# Patient Record
Sex: FEMALE | Race: WHITE | NOT HISPANIC OR LATINO | URBAN - METROPOLITAN AREA
[De-identification: names, ages, dates, MRNs, and addresses within clinical notes are randomized per-mention and may not be internally consistent; named-entity substitution may affect disease eponyms.]

---

## 2020-02-11 NOTE — PATIENT DISCUSSION
PATIENT CURRENTLY BREASTFEEDING -  DISC SMALL RISK OF TOPICAL DROPS ENTERING BLOODSTREAM. PT ADVISED ITS TO HER DISCRETION IF SHE WANTS TO CONSULT OBGYN/PEDIATRICIAN FOR CLEARANCE PRIOR TO TREATMENT. REPORTS STABLE RX &gt; MULTIPLE YEARS.

## 2020-06-02 NOTE — PATIENT DISCUSSION
PATIENT CURRENTLY BREASTFEEDING - DISC SMALL RISK OF TOPICAL DROPS ENTERING BLOODSTREAM. PT ADVISED ITS TO HER DISCRETION IF SHE WANTS TO CONSULT OBGYN/PEDIATRICIAN FOR CLEARANCE PRIOR TO TREATMENT. REPORTS STABLE RX &gt; MULTIPLE YEARS. PT ADVISED LORAZEPAM IS CATEGORY C DRUG; RECOMMEND CONSULT OBGYN VS. PUMP AND DUMP.

## 2020-06-02 NOTE — PATIENT DISCUSSION
New Prescription: Zymaxid (gatifloxacin): drops: 0.5% 1 drop four times a day as directed into both eyes 06-

## 2020-06-02 NOTE — PATIENT DISCUSSION
CYLINDER/AXIS CONFIRMED - PT.  PREFERS NO CYL/AXIS IN MANIFEST , CHALLENGED MULTIPLE TIMES AND PT KICKED OUT CYL EVERY TIME

## 2020-06-02 NOTE — PATIENT DISCUSSION
New Prescription: Pred Forte (prednisolone acetate): drops,suspension: 1% 1 drop as directed into both eyes 06-

## 2020-06-04 NOTE — PATIENT DISCUSSION
MYOPIA, OU- DISC OPT OF REFRACTIVE [id number]. PT UNDERSTANDS OPTIONS AND DESIRES TO PROCEED WITH LASIK TO REDUCE DEPENDENCY ON GLS/CTLS.

## 2020-06-04 NOTE — PATIENT DISCUSSION
Continue: Zymaxid (gatifloxacin): drops: 0.5% 1 drop four times a day as directed into both eyes 06-

## 2020-06-04 NOTE — PATIENT DISCUSSION
Continue: Pred Forte (prednisolone acetate): drops,suspension: 1% 1 drop as directed into both eyes 06-

## 2020-06-11 NOTE — PATIENT DISCUSSION
Stopped Today: Pred Forte (prednisolone acetate): drops,suspension: 1% 1 drop as directed into both eyes 06-

## 2020-06-11 NOTE — PATIENT DISCUSSION
Stopped Today: Zymaxid (gatifloxacin): drops: 0.5% 1 drop four times a day as directed into both eyes 06-

## 2021-02-27 NOTE — PATIENT DISCUSSION
Bedside and Verbal shift change report given to Sofia Vu (oncoming nurse) by Kingsley Hurst (offgoing nurse). Report included the following information SBAR, Kardex, ED Summary, Procedure Summary, MAR and Recent Results. It was discussed and explained that in the event topographical mapping indicates lenticular astigmatism in either eye(s) by having LASIK / PRK-ASA corneal astigmatism may/will be induced and will have to be addressed at the time of cataract surgery when that time comes.

## 2022-03-10 ENCOUNTER — ESTABLISHED PATIENT (OUTPATIENT)
Dept: URBAN - METROPOLITAN AREA CLINIC 4 | Facility: CLINIC | Age: 72
End: 2022-03-10

## 2022-03-10 DIAGNOSIS — H40.053: ICD-10-CM

## 2022-03-10 DIAGNOSIS — H02.831: ICD-10-CM

## 2022-03-10 DIAGNOSIS — H02.834: ICD-10-CM

## 2022-03-10 DIAGNOSIS — H01.02B: ICD-10-CM

## 2022-03-10 DIAGNOSIS — D49.2: ICD-10-CM

## 2022-03-10 DIAGNOSIS — H25.13: ICD-10-CM

## 2022-03-10 DIAGNOSIS — H11.153: ICD-10-CM

## 2022-03-10 DIAGNOSIS — E11.9: ICD-10-CM

## 2022-03-10 DIAGNOSIS — H43.813: ICD-10-CM

## 2022-03-10 DIAGNOSIS — H01.02A: ICD-10-CM

## 2022-03-10 PROCEDURE — 92014 COMPRE OPH EXAM EST PT 1/>: CPT

## 2022-03-10 PROCEDURE — 92133 CPTRZD OPH DX IMG PST SGM ON: CPT

## 2022-03-10 PROCEDURE — 92015 DETERMINE REFRACTIVE STATE: CPT

## 2022-03-10 ASSESSMENT — VISUAL ACUITY
OD_GLARE: 20/50
OU_CC: 20/20
OS_CC: 20/20
OS_GLARE: 20/50
OD_CC: 20/30-1

## 2022-03-10 ASSESSMENT — KERATOMETRY
OD_AXISANGLE2_DEGREES: 90
OS_AXISANGLE_DEGREES: 43
OS_K2POWER_DIOPTERS: 44.0
OD_K2POWER_DIOPTERS: 43.75
OD_AXISANGLE_DEGREES: 180
OD_K1POWER_DIOPTERS: 43.75
OS_AXISANGLE2_DEGREES: 133
OS_K1POWER_DIOPTERS: 43.5

## 2022-03-10 ASSESSMENT — TONOMETRY
OD_IOP_MMHG: 15
OS_IOP_MMHG: 15

## 2022-03-22 ENCOUNTER — ESTABLISHED PATIENT (OUTPATIENT)
Dept: URBAN - METROPOLITAN AREA CLINIC 4 | Facility: CLINIC | Age: 72
End: 2022-03-22

## 2022-03-22 DIAGNOSIS — H02.834: ICD-10-CM

## 2022-03-22 DIAGNOSIS — H02.831: ICD-10-CM

## 2022-03-22 PROCEDURE — 92082 INTERMEDIATE VISUAL FIELD XM: CPT

## 2022-03-22 PROCEDURE — 92285 EXTERNAL OCULAR PHOTOGRAPHY: CPT

## 2022-03-22 PROCEDURE — 92012 INTRM OPH EXAM EST PATIENT: CPT

## 2022-03-22 ASSESSMENT — VISUAL ACUITY
OD_CC: 20/30-2
OS_CC: 20/20

## 2022-04-13 PROBLEM — Z98.890: Noted: 2022-04-13

## 2022-04-19 ENCOUNTER — POST-OP (OUTPATIENT)
Dept: URBAN - METROPOLITAN AREA CLINIC 4 | Facility: CLINIC | Age: 72
End: 2022-04-19

## 2022-04-19 DIAGNOSIS — Z98.890: ICD-10-CM

## 2022-04-19 PROCEDURE — 99024 POSTOP FOLLOW-UP VISIT: CPT

## 2022-05-17 ENCOUNTER — POST-OP (OUTPATIENT)
Dept: URBAN - METROPOLITAN AREA CLINIC 4 | Facility: CLINIC | Age: 72
End: 2022-05-17

## 2022-05-17 DIAGNOSIS — Z98.890: ICD-10-CM

## 2022-05-17 PROCEDURE — 99024 POSTOP FOLLOW-UP VISIT: CPT

## 2022-08-22 NOTE — PATIENT DISCUSSION
Patient admits to incorrectly reporting allergies, patient is still unsure of what she is allergic to. D/c Maxitrol OS. Restart Lotemax TID OS. Hesitant to add additional AB due to recent reaction, even given epithelial disruption. Steroid only to see if inflammation calms and BCVA improve. RTC 1 day.

## 2022-08-23 NOTE — PATIENT DISCUSSION
Eye flushed today, neutral pH achieved before leaving office today. AB warranted with progressing SPK and decline of BCVA. Explicitly asked patient of any known allergies and previous antibiotics she has taken. Patient given verbal denial of known allergies. (+) s/p LASIK, and history of ocuflox usage.  Begin Ocuflox QID OS. RTC 1 day to check for progress in healing and to avoid additional reaction. Steroid likely warranted once epithelium has healed.

## 2022-08-24 NOTE — PATIENT DISCUSSION
AB coverage still warranted due to epithelial disruptions. Decrease Ocuflox BID OS, incase it is causing extended healing time due to irritation.  Steroid likely warranted once epithelial disruptions heal.

## 2022-08-24 NOTE — PATIENT DISCUSSION
Patient going out of town for the weekend. Personal number given in case of emergency. Instructed patient to wear sunglasses and lubricate heavily with PFATs q1-2hours to aid in resolution of symptoms.

## 2022-08-30 NOTE — PATIENT DISCUSSION
Eye flushed today, neutral pH achieved before leaving office today. Discontinue ocuflox. Continue PFATs q1-2hours. RTC 1 day for KDS to advise. Potential for flap complications.

## 2022-08-31 NOTE — PATIENT DISCUSSION
Follow up tomorrow 9/1 for a follow up. Corneal sensation, do not give drops at follow up appointment.

## 2022-09-07 NOTE — PATIENT DISCUSSION
endothelitis resolving with trace kp, continue valtrex tid and lotemax qid tapering to tid in 2 weeks. Return for follow up in 2 weeks.

## 2022-09-13 ENCOUNTER — DIAGNOSTICS ONLY (OUTPATIENT)
Dept: URBAN - METROPOLITAN AREA CLINIC 4 | Facility: CLINIC | Age: 72
End: 2022-09-13

## 2022-09-13 DIAGNOSIS — H40.053: ICD-10-CM

## 2022-09-13 PROCEDURE — 92083 EXTENDED VISUAL FIELD XM: CPT

## 2022-09-21 NOTE — PATIENT DISCUSSION
Pt was non-compliant on lotemax sunday-tuesday. Vision has improved although there are new opacities in the flap,  continue valtrex tid, increase lotemax to bid and start Zirgan1 gtt 5 times daily. Follow up Friday with Dr. Eber Edwards.

## 2023-05-10 ENCOUNTER — ESTABLISHED PATIENT (OUTPATIENT)
Dept: URBAN - METROPOLITAN AREA CLINIC 4 | Facility: CLINIC | Age: 73
End: 2023-05-10

## 2023-05-10 DIAGNOSIS — H40.053: ICD-10-CM

## 2023-05-10 DIAGNOSIS — H01.02A: ICD-10-CM

## 2023-05-10 DIAGNOSIS — H01.02B: ICD-10-CM

## 2023-05-10 DIAGNOSIS — H25.13: ICD-10-CM

## 2023-05-10 DIAGNOSIS — H43.813: ICD-10-CM

## 2023-05-10 DIAGNOSIS — H11.153: ICD-10-CM

## 2023-05-10 DIAGNOSIS — E11.9: ICD-10-CM

## 2023-05-10 PROCEDURE — 92014 COMPRE OPH EXAM EST PT 1/>: CPT

## 2023-05-10 PROCEDURE — 92015 DETERMINE REFRACTIVE STATE: CPT

## 2023-05-10 PROCEDURE — 92133 CPTRZD OPH DX IMG PST SGM ON: CPT

## 2023-05-10 ASSESSMENT — KERATOMETRY
OD_K2POWER_DIOPTERS: 44.25
OS_K2POWER_DIOPTERS: 43.75
OS_AXISANGLE2_DEGREES: 90
OS_AXISANGLE_DEGREES: 180
OS_K1POWER_DIOPTERS: 43.75
OD_AXISANGLE_DEGREES: 17
OD_AXISANGLE2_DEGREES: 107
OD_K1POWER_DIOPTERS: 44.00

## 2023-05-10 ASSESSMENT — VISUAL ACUITY
OS_CC: 20/20
OD_GLARE: 20/40
OU_CC: 20/20
OS_GLARE: 20/30
OD_CC: 20/30

## 2023-05-10 ASSESSMENT — TONOMETRY
OS_IOP_MMHG: 12
OD_IOP_MMHG: 13

## 2024-02-16 ENCOUNTER — DIAGNOSTICS ONLY (OUTPATIENT)
Facility: LOCATION | Age: 74
End: 2024-02-16

## 2024-02-16 DIAGNOSIS — H40.053: ICD-10-CM

## 2024-02-16 PROCEDURE — 92083 EXTENDED VISUAL FIELD XM: CPT

## 2024-04-12 NOTE — PATIENT DISCUSSION
Continue: Pred Forte (prednisolone acetate): drops,suspension: 1% 1 drop as directed into both eyes 06- Post-Op Check

## 2024-05-16 ENCOUNTER — ESTABLISHED PATIENT (OUTPATIENT)
Facility: LOCATION | Age: 74
End: 2024-05-16

## 2024-05-16 DIAGNOSIS — H43.813: ICD-10-CM

## 2024-05-16 DIAGNOSIS — H40.053: ICD-10-CM

## 2024-05-16 DIAGNOSIS — H11.153: ICD-10-CM

## 2024-05-16 DIAGNOSIS — H01.02B: ICD-10-CM

## 2024-05-16 DIAGNOSIS — H01.02A: ICD-10-CM

## 2024-05-16 DIAGNOSIS — E11.9: ICD-10-CM

## 2024-05-16 DIAGNOSIS — H25.13: ICD-10-CM

## 2024-05-16 PROCEDURE — 92014 COMPRE OPH EXAM EST PT 1/>: CPT

## 2024-05-16 PROCEDURE — 92015 DETERMINE REFRACTIVE STATE: CPT

## 2024-05-16 PROCEDURE — 92133 CPTRZD OPH DX IMG PST SGM ON: CPT

## 2024-05-16 ASSESSMENT — TONOMETRY
OD_IOP_MMHG: 14
OS_IOP_MMHG: 15

## 2024-05-16 ASSESSMENT — VISUAL ACUITY
OS_GLARE: 20/50
OD_CC: 20/40
OD_GLARE: 20/50
OS_CC: 20/25
OU_CC: 20/25

## 2024-11-15 ENCOUNTER — DIAGNOSTICS ONLY (OUTPATIENT)
Facility: LOCATION | Age: 74
End: 2024-11-15

## 2024-11-15 DIAGNOSIS — H40.053: ICD-10-CM

## 2024-11-15 PROCEDURE — 92083 EXTENDED VISUAL FIELD XM: CPT

## 2024-11-25 ENCOUNTER — FOLLOW UP (OUTPATIENT)
Facility: LOCATION | Age: 74
End: 2024-11-25

## 2024-11-25 DIAGNOSIS — H40.053: ICD-10-CM

## 2024-11-25 PROCEDURE — 99213 OFFICE O/P EST LOW 20 MIN: CPT

## 2025-05-27 ENCOUNTER — COMPREHENSIVE EXAM (OUTPATIENT)
Age: 75
End: 2025-05-27

## 2025-05-27 DIAGNOSIS — H25.13: ICD-10-CM

## 2025-05-27 DIAGNOSIS — E11.9: ICD-10-CM

## 2025-05-27 DIAGNOSIS — H11.153: ICD-10-CM

## 2025-05-27 DIAGNOSIS — H40.053: ICD-10-CM

## 2025-05-27 DIAGNOSIS — H01.02B: ICD-10-CM

## 2025-05-27 DIAGNOSIS — H01.02A: ICD-10-CM

## 2025-05-27 DIAGNOSIS — H43.813: ICD-10-CM

## 2025-05-27 PROCEDURE — 92014 COMPRE OPH EXAM EST PT 1/>: CPT

## 2025-05-27 PROCEDURE — 92015 DETERMINE REFRACTIVE STATE: CPT

## 2025-05-27 PROCEDURE — 92133 CPTRZD OPH DX IMG PST SGM ON: CPT

## 2025-05-27 PROCEDURE — 92134 CPTRZ OPH DX IMG PST SGM RTA: CPT | Mod: NC
